# Patient Record
Sex: FEMALE | Race: WHITE | Employment: FULL TIME | URBAN - METROPOLITAN AREA
[De-identification: names, ages, dates, MRNs, and addresses within clinical notes are randomized per-mention and may not be internally consistent; named-entity substitution may affect disease eponyms.]

---

## 2021-09-10 NOTE — H&P
[facility]  Obstetrics and Gynecology   History & Physical  NEW    Chief complaint: Patient presents with:  Wellness Visit: new pt  Gyn Problem: Pt c/o on/off spotting recently. Has had Mirena IUD in place since 9/2020.      Subjective:     HPI: L chest pain and palpitations. Gastrointestinal: Negative for blood in stool, constipation and diarrhea. Genitourinary: Positive for vaginal bleeding. Negative for dysuria, frequency, hematuria, menstrual problem, pelvic pain and vaginal discharge.    Ski HIVES    PMH:  Past Medical History:   Diagnosis Date   • Acne    • Chlamydia 2020   • Hirsutism     shaves lower abdomen, waxes upper lip, chin   • History of  delivery 2011 &     • History of prior pregnancy with IUGR   and Family: Not on file      Attends Holiness Services: Not on file      Active Member of Clubs or Organizations: Not on file      Attends Club or Organization Meetings: Not on file      Marital Status: Not on file  Intimate Partner Violence:       Fear o masses  Negative nipple discharge or skin changes. Negative axillary lymphadenopathy. Dense breasts. Abdominal: Soft. She exhibits no distension and no mass. There is no abdominal tenderness. There is no rebound and no guarding.  No hernia.   +Most of lo RISK , THIN PREP COLLECTION; Future  -     CBC WITH DIFFERENTIAL WITH PLATELET; Future  -     COMP METABOLIC PANEL (14); Future  -     HEMOGLOBIN A1C; Future  -     LIPID PANEL;  Future  -     TSH+FREE T4; Future    Screening for cervical cancer  -     THIN

## 2021-09-10 NOTE — PATIENT INSTRUCTIONS
Labs early morning (prior to 10 am) & fasting.      Patient Information about GARDASIL®9 (pronounced “[id number] n?n”)  (Human Papillomavirus 9-valent Vaccine, Recombinant)    IT IS RECOMMENDED THAT YOU CHECK WITH YOUR INSURANCE ABOUT COVERAGE FOR THIS VA still get routine cervical cancer screening. • Does not protect the person getting GARDASIL 9 from a disease that is caused by other types of  HPV, other viruses or bacteria. • Does not treat HPV infection.   • Does not protect the person getting GARDASIL given.     **The need to use a 3-dose schedule instead of a 2-dose schedule will be determined by your health care  Professional.    Make sure that you or your child gets all doses recommended by your health care professional so that  you or your child gets signs of an  allergic reaction:  • difficulty breathing  • wheezing (bronchospasm)  • hives  • rash    Additional side effects that have been reported during general use for GARDASIL 9 are shown below.   Side effects reported during the general use of GARDA please talk to the health care professional or visit  www. [email or web address]. For patent information: [email or web address]/product/patent/home.html  The trademarks depicted herein are owned by their respective companies.   Copyright © 1322-9056 [address]

## 2021-09-13 ENCOUNTER — OFFICE VISIT (OUTPATIENT)
Dept: OBGYN CLINIC | Facility: CLINIC | Age: 31
End: 2021-09-13
Payer: COMMERCIAL

## 2021-09-13 VITALS
SYSTOLIC BLOOD PRESSURE: 122 MMHG | DIASTOLIC BLOOD PRESSURE: 80 MMHG | WEIGHT: 158.63 LBS | HEART RATE: 88 BPM | HEIGHT: 63.5 IN | BODY MASS INDEX: 27.76 KG/M2

## 2021-09-13 DIAGNOSIS — Z97.5 IUD (INTRAUTERINE DEVICE) IN PLACE: ICD-10-CM

## 2021-09-13 DIAGNOSIS — Z12.4 SCREENING FOR CERVICAL CANCER: ICD-10-CM

## 2021-09-13 DIAGNOSIS — E66.3 OVERWEIGHT (BMI 25.0-29.9): ICD-10-CM

## 2021-09-13 DIAGNOSIS — N93.0 POSTCOITAL BLEEDING: ICD-10-CM

## 2021-09-13 DIAGNOSIS — Z11.3 SCREEN FOR STD (SEXUALLY TRANSMITTED DISEASE): ICD-10-CM

## 2021-09-13 DIAGNOSIS — N92.0 SPOTTING: ICD-10-CM

## 2021-09-13 DIAGNOSIS — Z71.85 HPV VACCINE COUNSELING: ICD-10-CM

## 2021-09-13 DIAGNOSIS — L68.0 HIRSUTISM: ICD-10-CM

## 2021-09-13 DIAGNOSIS — Z01.411 ENCOUNTER FOR WELL WOMAN EXAM WITH ABNORMAL FINDINGS: Primary | ICD-10-CM

## 2021-09-13 LAB
CONTROL LINE PRESENT WITH A CLEAR BACKGROUND (YES/NO): YES YES/NO
PREGNANCY TEST, URINE: NEGATIVE

## 2021-09-13 PROCEDURE — 81025 URINE PREGNANCY TEST: CPT | Performed by: OBSTETRICS & GYNECOLOGY

## 2021-09-13 PROCEDURE — 87591 N.GONORRHOEAE DNA AMP PROB: CPT | Performed by: OBSTETRICS & GYNECOLOGY

## 2021-09-13 PROCEDURE — 99385 PREV VISIT NEW AGE 18-39: CPT | Performed by: OBSTETRICS & GYNECOLOGY

## 2021-09-13 PROCEDURE — 3008F BODY MASS INDEX DOCD: CPT | Performed by: OBSTETRICS & GYNECOLOGY

## 2021-09-13 PROCEDURE — 87624 HPV HI-RISK TYP POOLED RSLT: CPT | Performed by: OBSTETRICS & GYNECOLOGY

## 2021-09-13 PROCEDURE — 99213 OFFICE O/P EST LOW 20 MIN: CPT | Performed by: OBSTETRICS & GYNECOLOGY

## 2021-09-13 PROCEDURE — 88175 CYTOPATH C/V AUTO FLUID REDO: CPT | Performed by: OBSTETRICS & GYNECOLOGY

## 2021-09-13 PROCEDURE — 87491 CHLMYD TRACH DNA AMP PROBE: CPT | Performed by: OBSTETRICS & GYNECOLOGY

## 2021-09-13 PROCEDURE — 3079F DIAST BP 80-89 MM HG: CPT | Performed by: OBSTETRICS & GYNECOLOGY

## 2021-09-13 PROCEDURE — 3074F SYST BP LT 130 MM HG: CPT | Performed by: OBSTETRICS & GYNECOLOGY

## 2021-09-14 LAB
C TRACH DNA SPEC QL NAA+PROBE: NEGATIVE
HPV I/H RISK 1 DNA SPEC QL NAA+PROBE: NEGATIVE
N GONORRHOEA DNA SPEC QL NAA+PROBE: NEGATIVE

## 2021-09-16 ENCOUNTER — TELEPHONE (OUTPATIENT)
Dept: OBGYN CLINIC | Facility: CLINIC | Age: 31
End: 2021-09-16

## 2021-09-16 ENCOUNTER — OFFICE VISIT (OUTPATIENT)
Dept: OBGYN CLINIC | Facility: CLINIC | Age: 31
End: 2021-09-16
Payer: COMMERCIAL

## 2021-09-16 VITALS
BODY MASS INDEX: 27.82 KG/M2 | WEIGHT: 159 LBS | SYSTOLIC BLOOD PRESSURE: 120 MMHG | HEIGHT: 63.5 IN | DIASTOLIC BLOOD PRESSURE: 70 MMHG | HEART RATE: 75 BPM

## 2021-09-16 DIAGNOSIS — N89.8 VAGINA ITCHING: Primary | ICD-10-CM

## 2021-09-16 PROCEDURE — 3008F BODY MASS INDEX DOCD: CPT | Performed by: OBSTETRICS & GYNECOLOGY

## 2021-09-16 PROCEDURE — 3074F SYST BP LT 130 MM HG: CPT | Performed by: OBSTETRICS & GYNECOLOGY

## 2021-09-16 PROCEDURE — 87480 CANDIDA DNA DIR PROBE: CPT | Performed by: OBSTETRICS & GYNECOLOGY

## 2021-09-16 PROCEDURE — 3078F DIAST BP <80 MM HG: CPT | Performed by: OBSTETRICS & GYNECOLOGY

## 2021-09-16 PROCEDURE — 87510 GARDNER VAG DNA DIR PROBE: CPT | Performed by: OBSTETRICS & GYNECOLOGY

## 2021-09-16 PROCEDURE — 87660 TRICHOMONAS VAGIN DIR PROBE: CPT | Performed by: OBSTETRICS & GYNECOLOGY

## 2021-09-16 NOTE — PROGRESS NOTES
Previous affirm could not be processed. She complains of external itching. No discharge. IUD strings seen affirm.

## 2021-09-16 NOTE — TELEPHONE ENCOUNTER
Spoke to patient and informed her that new specimen for vaginal culture needs to be collected. Patient will come in today at 12pm for vaginal culture swab.

## 2021-09-18 ENCOUNTER — LAB ENCOUNTER (OUTPATIENT)
Dept: LAB | Age: 31
End: 2021-09-18
Attending: OBSTETRICS & GYNECOLOGY
Payer: COMMERCIAL

## 2021-09-18 DIAGNOSIS — L68.0 HIRSUTISM: ICD-10-CM

## 2021-09-18 DIAGNOSIS — Z01.411 ENCOUNTER FOR WELL WOMAN EXAM WITH ABNORMAL FINDINGS: ICD-10-CM

## 2021-09-18 DIAGNOSIS — N92.0 SPOTTING: ICD-10-CM

## 2021-09-18 LAB
ALBUMIN SERPL-MCNC: 4.2 G/DL (ref 3.4–5)
ALBUMIN/GLOB SERPL: 1.2 {RATIO} (ref 1–2)
ALP LIVER SERPL-CCNC: 73 U/L
ALT SERPL-CCNC: 24 U/L
ANION GAP SERPL CALC-SCNC: 3 MMOL/L (ref 0–18)
AST SERPL-CCNC: 9 U/L (ref 15–37)
BASOPHILS # BLD AUTO: 0.02 X10(3) UL (ref 0–0.2)
BASOPHILS NFR BLD AUTO: 0.3 %
BILIRUB SERPL-MCNC: 0.6 MG/DL (ref 0.1–2)
BUN BLD-MCNC: 16 MG/DL (ref 7–18)
CALCIUM BLD-MCNC: 9.1 MG/DL (ref 8.5–10.1)
CHLORIDE SERPL-SCNC: 109 MMOL/L (ref 98–112)
CHOLEST SERPL-MCNC: 207 MG/DL (ref ?–200)
CO2 SERPL-SCNC: 25 MMOL/L (ref 21–32)
CREAT BLD-MCNC: 0.7 MG/DL
DHEA-S SERPL-MCNC: 267.9 UG/DL
EOSINOPHIL # BLD AUTO: 0.1 X10(3) UL (ref 0–0.7)
EOSINOPHIL NFR BLD AUTO: 1.6 %
ERYTHROCYTE [DISTWIDTH] IN BLOOD BY AUTOMATED COUNT: 12.7 %
EST. AVERAGE GLUCOSE BLD GHB EST-MCNC: 105 MG/DL (ref 68–126)
GLOBULIN PLAS-MCNC: 3.4 G/DL (ref 2.8–4.4)
GLUCOSE BLD-MCNC: 92 MG/DL (ref 70–99)
HBA1C MFR BLD HPLC: 5.3 % (ref ?–5.7)
HCT VFR BLD AUTO: 42.6 %
HDLC SERPL-MCNC: 66 MG/DL (ref 40–59)
HGB BLD-MCNC: 14 G/DL
IMM GRANULOCYTES # BLD AUTO: 0.02 X10(3) UL (ref 0–1)
IMM GRANULOCYTES NFR BLD: 0.3 %
LDLC SERPL CALC-MCNC: 131 MG/DL (ref ?–100)
LYMPHOCYTES # BLD AUTO: 2.17 X10(3) UL (ref 1–4)
LYMPHOCYTES NFR BLD AUTO: 35.5 %
MCH RBC QN AUTO: 28.8 PG (ref 26–34)
MCHC RBC AUTO-ENTMCNC: 32.9 G/DL (ref 31–37)
MCV RBC AUTO: 87.7 FL
MONOCYTES # BLD AUTO: 0.47 X10(3) UL (ref 0.1–1)
MONOCYTES NFR BLD AUTO: 7.7 %
NEUTROPHILS # BLD AUTO: 3.34 X10 (3) UL (ref 1.5–7.7)
NEUTROPHILS # BLD AUTO: 3.34 X10(3) UL (ref 1.5–7.7)
NEUTROPHILS NFR BLD AUTO: 54.6 %
NONHDLC SERPL-MCNC: 141 MG/DL (ref ?–130)
OSMOLALITY SERPL CALC.SUM OF ELEC: 285 MOSM/KG (ref 275–295)
PATIENT FASTING Y/N/NP: YES
PATIENT FASTING Y/N/NP: YES
PLATELET # BLD AUTO: 249 10(3)UL (ref 150–450)
POTASSIUM SERPL-SCNC: 4.6 MMOL/L (ref 3.5–5.1)
PROT SERPL-MCNC: 7.6 G/DL (ref 6.4–8.2)
RBC # BLD AUTO: 4.86 X10(6)UL
SODIUM SERPL-SCNC: 137 MMOL/L (ref 136–145)
T4 FREE SERPL-MCNC: 0.9 NG/DL (ref 0.8–1.7)
TRIGL SERPL-MCNC: 57 MG/DL (ref 30–149)
TSI SER-ACNC: 0.99 MIU/ML (ref 0.36–3.74)
VLDLC SERPL CALC-MCNC: 10 MG/DL (ref 0–30)
WBC # BLD AUTO: 6.1 X10(3) UL (ref 4–11)

## 2021-09-18 PROCEDURE — 84403 ASSAY OF TOTAL TESTOSTERONE: CPT

## 2021-09-18 PROCEDURE — 83520 IMMUNOASSAY QUANT NOS NONAB: CPT

## 2021-09-18 PROCEDURE — 80061 LIPID PANEL: CPT

## 2021-09-18 PROCEDURE — 84443 ASSAY THYROID STIM HORMONE: CPT

## 2021-09-18 PROCEDURE — 82627 DEHYDROEPIANDROSTERONE: CPT

## 2021-09-18 PROCEDURE — 36415 COLL VENOUS BLD VENIPUNCTURE: CPT

## 2021-09-18 PROCEDURE — 83036 HEMOGLOBIN GLYCOSYLATED A1C: CPT

## 2021-09-18 PROCEDURE — 85025 COMPLETE CBC W/AUTO DIFF WBC: CPT

## 2021-09-18 PROCEDURE — 84439 ASSAY OF FREE THYROXINE: CPT

## 2021-09-18 PROCEDURE — 80053 COMPREHEN METABOLIC PANEL: CPT

## 2021-09-18 PROCEDURE — 84402 ASSAY OF FREE TESTOSTERONE: CPT

## 2021-09-21 ENCOUNTER — TELEPHONE (OUTPATIENT)
Dept: OBGYN CLINIC | Facility: CLINIC | Age: 31
End: 2021-09-21

## 2021-09-21 NOTE — TELEPHONE ENCOUNTER
Patient informed results have not been review per MD, once reviewed and recommendations provided we will contact patient. Patient verbalized understanding.

## 2021-09-22 PROBLEM — E78.5 HYPERLIPIDEMIA: Status: ACTIVE | Noted: 2021-09-18

## 2021-09-22 LAB — ANTI-MULLERIAN HORMONE: 1.83 NG/ML

## 2021-09-23 LAB
SEX HORMONE BINDING GLOBULIN: 44 NMOL/L
TESTOSTERONE -MS, BIOAVAILAB: 13.4 NG/DL
TESTOSTERONE, -MS/MS: 33 NG/DL
TESTOSTERONE, FREE -MS/MS: 4.5 PG/ML

## 2024-03-04 ENCOUNTER — OFFICE VISIT (OUTPATIENT)
Dept: OBGYN CLINIC | Facility: CLINIC | Age: 34
End: 2024-03-04
Payer: COMMERCIAL

## 2024-03-04 VITALS
HEART RATE: 88 BPM | DIASTOLIC BLOOD PRESSURE: 84 MMHG | BODY MASS INDEX: 24.74 KG/M2 | HEIGHT: 60 IN | SYSTOLIC BLOOD PRESSURE: 134 MMHG | WEIGHT: 126 LBS

## 2024-03-04 DIAGNOSIS — Z01.419 NORMAL GYNECOLOGIC EXAMINATION: Primary | ICD-10-CM

## 2024-03-04 DIAGNOSIS — Z32.00 PREGNANCY EXAMINATION OR TEST, PREGNANCY UNCONFIRMED: ICD-10-CM

## 2024-03-04 DIAGNOSIS — T83.32XA IUD MIGRATION, INITIAL ENCOUNTER: ICD-10-CM

## 2024-03-04 LAB
KIT LOT #: NORMAL NUMERIC
PREGNANCY TEST, URINE: NEGATIVE

## 2024-03-04 PROCEDURE — 87491 CHLMYD TRACH DNA AMP PROBE: CPT | Performed by: OBSTETRICS & GYNECOLOGY

## 2024-03-04 PROCEDURE — 87591 N.GONORRHOEAE DNA AMP PROB: CPT | Performed by: OBSTETRICS & GYNECOLOGY

## 2024-03-04 NOTE — PROGRESS NOTES
Teressa Wiggins is a 33 year old female  No LMP recorded. (Menstrual status: IUD - Intrauterine Device).   Chief Complaint   Patient presents with    Annual     Pt presents for annual, states she has not felt her IUD strings in about a month. Pt c/o painful urination that lasted one day, but frequent urination. Pt states last menses was brown.      Annual. Mirena . Pt c/o  spotting daily for months.   OBSTETRICS HISTORY:     OB History    Para Term  AB Living   3 2   2 1 2   SAB IAB Ectopic Multiple Live Births   1       2      # Outcome Date GA Lbr Saúl/2nd Weight Sex Delivery Anes PTL Lv   3  09/30/15 35w1d  5 lb 3 oz (2.353 kg) M CS-Unspec   CANDY      Birth Comments: PROM    2  11 30w0d  1 lb 10 oz (0.737 kg) M CS-Unspec   CANDY      Birth Comments: 2011 at [facility] at 30w secondary to fetus with oligohydramnios, IUGR and gastroschisis   1 SAB 2007        DEC      Obstetric Comments    Baby - did not end up needing surgery. Reduced manually & sticker put over it. Healed spontaneously. Developmentally doing well. 2019 - had bowel obstruction & surgery for that.     Baby is healthy but was in NICU until term. Healthy       GYNE HISTORY:     Hx Prior Abnormal Pap: No  Pap Date: 21  Pap Result Notes: normal   Menarche: 13 (3/4/2024  3:15 PM)  Period Cycle (Days): no menses due to IUD recent spotting (3/4/2024  3:15 PM)  Use of Birth Control (if yes, specify type): Mirena IUD (2020) (3/4/2024  3:15 PM)  Hx Prior Abnormal Pap: No (3/4/2024  3:15 PM)  Pap Date: 21 (3/4/2024  3:15 PM)  Pap Result Notes: normal (3/4/2024  3:15 PM)        Latest Ref Rng & Units 2021     7:11 PM   RECENT PAP RESULTS   Thinprep Pap Negative for intraepithelial lesion or malignancy Negative for intraepithelial lesion or malignancy    HPV Negative Negative          MEDICAL HISTORY:     Past Medical History:   Diagnosis Date    Acne     Chlamydia 2020    Hirsutism     shaves  lower abdomen, waxes upper lip, chin    History of  delivery 2011     &      History of prior pregnancy with IUGR      baby with IUGR, oligohydramnios, gastroschisis    Hyperlipidemia 2021    Overweight (BMI 25.0-29.9) 2021    Pap smear for cervical cancer screening 2021    Pap & HPV negative     Retroverted uterus        SURGICAL HISTORY:     Past Surgical History:   Procedure Laterality Date                INSERT INTRAUTERINE DEVICE  2020    Mirena IUD insertion       SOCIAL HISTORY:     Social History     Socioeconomic History    Marital status:    Tobacco Use    Smoking status: Never    Smokeless tobacco: Never   Vaping Use    Vaping Use: Never used   Substance and Sexual Activity    Alcohol use: Not Currently    Drug use: Never    Sexual activity: Yes     Partners: Male     Birth control/protection: Mirena, I.U.D.     Comment: mirena inserted 2020        FAMILY HISTORY:     Family History   Problem Relation Age of Onset    No Known Problems Father     Lipids Mother     Hypertension Mother     Skin cancer Mother         Non-melanoma probably     DVT/VTE Mother         postpartum after giving birth     Other (dvt) Mother         2nd clot      No Known Problems Sister     Birth Defects Son         gastroshisis     No Known Problems Sister     Stroke Neg     Breast Cancer Neg     Ovarian Cancer Neg     Colon Cancer Neg     Clotting Disorder Neg     Diabetes Neg     Infertility Neg     Endometriosis Neg     Genetic Disease Neg        MEDICATIONS:       Current Outpatient Medications:     levonorgestrel 20 MCG/24HR Intrauterine IUD, , Disp: , Rfl:     ALLERGIES:       Allergies   Allergen Reactions    Penicillins HIVES and RASH         REVIEW OF SYSTEMS:     Constitutional:    denies fever / chills  Eyes:     denies blurred or double vision  Cardiovascular:  denies chest pain or palpitations  Respiratory:    denies shortness of  breath  Gastrointestinal:  denies severe abdominal pain, frequent diarrhea or constipation, nausea / vomiting  Genitourinary:    denies dysuria, bothersome incontinence  Skin/Breast:   denies any breast pain, lumps, or discharge  Neurological:    denies frequent severe headaches  Psychiatric:   denies depression or anxiety, thoughts of harming self or others  Heme/Lymph:    denies easy bruising or bleeding      PHYSICAL EXAM:   Blood pressure 134/84, pulse 88, height 5' (1.524 m), weight 126 lb (57.2 kg), not currently breastfeeding.  Constitutional:  well developed, well nourished  Head/Face:  normocephalic  Neck/Thyroid: thyroid symmetric, no thyromegaly, no nodules, no adenopathy  Lymphatic: no abnormal supraclavicular or axillary adenopathy is noted  Respiratory:      chest wall symmetric and nontender on palpation, clear to asculation bilateral, no wheezing, rales, ronchi, and resonance normal upon percussion  Cardiovascular: chest normal in appearance, regular rate and rhythm, no murmurs, PMI palpated midclavicular line  Breast:   normal without palpable masses, tenderness, asymmetry, nipple discharge, nipple retraction or skin changes  Abdomen:   soft, nontender, nondistended, no masses  Skin/Hair:  no unusual rashes or bruises  Extremities:  no edema, no cyanosis, non tender bilaterally  Psychiatric:   oriented to time, place, person and situation. Appropriate mood and affect    Pelvic Exam:  External Genitalia:  normal appearance, hair distribution, and no lesions  Urethral Meatus:   normal in size, location, without lesions   Bladder:    no fullness, masses or tenderness  Vagina:    normal appearance without lesions, no abnormal discharge, positive dark brown menses, light, no strings seen  Cervix:    No lesions, normal friability   Uterus:    normal in size, 8 wk sized, normal contour, position, mobility, without  motion tenderness  Adnexa:   normal without masses or tenderness  Perineum:   normal  Anus:  no hemorroids         ASSESSMENT & PLAN:     Teressa was seen today for annual.    Diagnoses and all orders for this visit:    Normal gynecologic examination  -     ThinPrep Pap with HPV Reflex, Chlamydia/GC; Future  -     Chlamydia/Gc Amplification; Future  Nutrition, weight screening and exercise were discussed with the patient.  Exercise should encompass approximately 150 minutes/week.  Self breast exam counseling was also given.  I advised the patient to avoid tobacco, drugs and alcohol.  Influenza vaccine was offered if seasonally appropriate.  HPV and STD prevention counseling was given.  Health maintenance checklist  was reviewed including Pap smear, cervical cultures, and mammogram screening if age-appropriate.  Appropriate follow-up scheduling was discussed with the patient.      IUD migration, initial encounter  -     US PELVIS (TRANSABDOMINAL AND TRANSVAGINAL) (CPT=76856/26713); Future    Ultrasound for iud placement. Bleeding is from uterus, so if iud is in good spot may keep it (b/c spotting is normal side effect) or change to ocp low dose.   Contraceptive counseling given.  Efficacy, side effects, risks and benefits of oral contraceptive pills, NuvaRing, Depo-Provera, intrauterine device, patch, Nexplanon, abstinence, condoms, and permanent sterilization were discussed with the patient and patient verbalized understanding and all questions were answered.  I advised condoms or abstinence as backup during the first month of contraception use for pregnancy prevention and I advised condom use at all times for HPV and STD prevention if clinically appropriate.         FOLLOW-UP     Return in about 4 weeks (around 4/1/2024) for gyn prblm.      Shoaib Dunn MD  3/4/2024

## 2024-03-05 LAB
C TRACH DNA SPEC QL NAA+PROBE: NEGATIVE
N GONORRHOEA DNA SPEC QL NAA+PROBE: NEGATIVE

## 2024-03-18 LAB — HPV I/H RISK 1 DNA SPEC QL NAA+PROBE: NEGATIVE

## 2024-03-24 ENCOUNTER — HOSPITAL ENCOUNTER (OUTPATIENT)
Dept: ULTRASOUND IMAGING | Age: 34
End: 2024-03-24
Attending: OBSTETRICS & GYNECOLOGY
Payer: COMMERCIAL

## 2024-03-24 ENCOUNTER — HOSPITAL ENCOUNTER (OUTPATIENT)
Dept: ULTRASOUND IMAGING | Age: 34
Discharge: HOME | End: 2024-03-24
Attending: OBSTETRICS & GYNECOLOGY
Payer: COMMERCIAL

## 2024-03-24 DIAGNOSIS — T83.32XA IUD MIGRATION, INITIAL ENCOUNTER: ICD-10-CM

## 2024-03-24 PROCEDURE — 76856 US EXAM PELVIC COMPLETE: CPT | Performed by: OBSTETRICS & GYNECOLOGY

## 2024-03-24 PROCEDURE — 76830 TRANSVAGINAL US NON-OB: CPT | Performed by: OBSTETRICS & GYNECOLOGY

## 2024-04-01 ENCOUNTER — OFFICE VISIT (OUTPATIENT)
Dept: OBGYN CLINIC | Facility: CLINIC | Age: 34
End: 2024-04-01

## 2024-04-01 VITALS
WEIGHT: 127 LBS | HEIGHT: 62.5 IN | SYSTOLIC BLOOD PRESSURE: 111 MMHG | HEART RATE: 72 BPM | DIASTOLIC BLOOD PRESSURE: 71 MMHG | BODY MASS INDEX: 22.79 KG/M2

## 2024-04-01 DIAGNOSIS — N92.1 MENORRHAGIA WITH IRREGULAR CYCLE: Primary | ICD-10-CM

## 2024-04-01 PROCEDURE — 99213 OFFICE O/P EST LOW 20 MIN: CPT | Performed by: OBSTETRICS & GYNECOLOGY

## 2024-04-01 NOTE — PROGRESS NOTES
Teressa Wiggins is a 33 year old female  No LMP recorded. (Menstrual status: IUD - Intrauterine Device).   Chief Complaint   Patient presents with    Follow - Up     F/u from u/s     Pt states last visit she had spotting. Ultrasound shows iud in proper place. Pt states bleeding minimal daily.   OBSTETRICS HISTORY:     OB History    Para Term  AB Living   3 2   2 1 2   SAB IAB Ectopic Multiple Live Births   1       2      # Outcome Date GA Lbr Saúl/2nd Weight Sex Delivery Anes PTL Lv   3  09/30/15 35w1d  5 lb 3 oz (2.353 kg) M CS-Unspec   CANDY      Birth Comments: PROM    2  11 30w0d  1 lb 10 oz (0.737 kg) M CS-Unspec   CANDY      Birth Comments:  at [facility] at 30w secondary to fetus with oligohydramnios, IUGR and gastroschisis   1 SAB 2007        DEC      Obstetric Comments    Baby - did not end up needing surgery. Reduced manually & sticker put over it. Healed spontaneously. Developmentally doing well.  - had bowel obstruction & surgery for that.     Baby is healthy but was in NICU until term. Healthy       GYNE HISTORY:     Hx Prior Abnormal Pap: No  Pap Date: 24  Pap Result Notes: normal   Menarche: 13 (2024  2:01 PM)  Period Cycle (Days): no menses due to IUD (2024  2:01 PM)  Use of Birth Control (if yes, specify type): Mirena IUD (2024  2:05 PM)  Hx Prior Abnormal Pap: No (2024  2:05 PM)  Pap Date: 24 (2024  2:01 PM)  Pap Result Notes: normal (2024  2:01 PM)        Latest Ref Rng & Units 3/4/2024     3:36 PM 2021     7:11 PM   RECENT PAP RESULTS   Thinprep Pap Negative for intraepithelial lesion or malignancy Negative for intraepithelial lesion or malignancy  Negative for intraepithelial lesion or malignancy    HPV Negative Negative  Negative          MEDICAL HISTORY:     Past Medical History:   Diagnosis Date    Acne     Chlamydia 2020    Hirsutism     shaves lower abdomen, waxes upper lip, chin    History of   delivery 2011 &      History of prior pregnancy with IUGR      baby with IUGR, oligohydramnios, gastroschisis    Hyperlipidemia 2021    Overweight (BMI 25.0-29.9) 2021    Pap smear for cervical cancer screening 2021    Pap & HPV negative     Retroverted uterus        SURGICAL HISTORY:     Past Surgical History:   Procedure Laterality Date                INSERT INTRAUTERINE DEVICE  2020    Mirena IUD insertion       SOCIAL HISTORY:     Social History     Socioeconomic History    Marital status:    Tobacco Use    Smoking status: Never    Smokeless tobacco: Never   Vaping Use    Vaping Use: Never used   Substance and Sexual Activity    Alcohol use: Not Currently    Drug use: Never    Sexual activity: Yes     Partners: Male     Birth control/protection: Mirena, I.U.D.     Comment: mirena inserted 2020        FAMILY HISTORY:     Family History   Problem Relation Age of Onset    No Known Problems Father     Lipids Mother     Hypertension Mother     Skin cancer Mother         Non-melanoma probably     DVT/VTE Mother         postpartum after giving birth     Other (dvt) Mother         2nd clot      No Known Problems Sister     Birth Defects Son         gastroshisis     No Known Problems Sister     Stroke Neg     Breast Cancer Neg     Ovarian Cancer Neg     Colon Cancer Neg     Clotting Disorder Neg     Diabetes Neg     Infertility Neg     Endometriosis Neg     Genetic Disease Neg        MEDICATIONS:       Current Outpatient Medications:     levonorgestrel 20 MCG/24HR Intrauterine IUD, , Disp: , Rfl:     ALLERGIES:       Allergies   Allergen Reactions    Penicillins HIVES and RASH         REVIEW OF SYSTEMS:     Constitutional:    denies fever / chills  Eyes:     denies blurred or double vision  Cardiovascular:  denies chest pain or palpitations  Respiratory:    denies shortness of breath  Gastrointestinal:  denies severe abdominal pain, frequent  diarrhea or constipation, nausea / vomiting  Genitourinary:    denies dysuria, bothersome incontinence  Skin/Breast:   denies any breast pain, lumps, or discharge  Neurological:    denies frequent severe headaches  Psychiatric:   denies depression or anxiety, thoughts of harming self or others  Heme/Lymph:    denies easy bruising or bleeding      PHYSICAL EXAM:   Blood pressure 111/71, pulse 72, height 5' 2.5\" (1.588 m), weight 127 lb (57.6 kg), not currently breastfeeding.  Constitutional:  well developed, well nourished  Anus: no hemorroids         ASSESSMENT & PLAN:     Teressa was seen today for follow - up.    Diagnoses and all orders for this visit:    Menorrhagia with irregular cycle    Patient has had her IUD for 3 years and states the bleeding is very irregular but minimal.  Discussed with her the March 24, 2024 ultrasound showing that the IUD is in a normal spot and that the uterus is normal anatomically.  I offered to take the IUD out and start birth control as I think that would regulate her menses better or she could keep the IUD as I think the irregular bleeding is a normal side effect.  For now she will continue with the IUD and will call me or return for an IUD removal/birth control initiation if she desires in the future.  Otherwise I recommend she come back for annual exam in March 2025.  Recently normal Pap smear discussed with the patient.    I spent 22 minutes face-to-face with the patient, over half of the time in discussion and counseling of the diagnosis, work-up steps, and potential treatment plans.  This time was also spent for chart review including obtaining and/or reviewing additional medical history, prior labs and radiology testing as well as documenting clinical information in the electronic medical record, independently interpreting results and communicating results to the patient and/or family and coordinating care.     FOLLOW-UP     No follow-ups on file.      Shoaib Dunn,  MD  4/1/2024